# Patient Record
Sex: MALE | Race: WHITE
[De-identification: names, ages, dates, MRNs, and addresses within clinical notes are randomized per-mention and may not be internally consistent; named-entity substitution may affect disease eponyms.]

---

## 2018-05-28 ENCOUNTER — HOSPITAL ENCOUNTER (EMERGENCY)
Dept: HOSPITAL 92 - ERS | Age: 73
Discharge: HOME | End: 2018-05-28
Payer: COMMERCIAL

## 2018-05-28 DIAGNOSIS — Z79.899: ICD-10-CM

## 2018-05-28 DIAGNOSIS — K08.89: Primary | ICD-10-CM

## 2018-05-28 DIAGNOSIS — Z79.82: ICD-10-CM

## 2018-05-28 DIAGNOSIS — E11.9: ICD-10-CM

## 2018-05-28 DIAGNOSIS — Z87.891: ICD-10-CM

## 2018-05-28 DIAGNOSIS — I10: ICD-10-CM

## 2018-05-28 LAB
ALBUMIN SERPL BCG-MCNC: 3.6 G/DL (ref 3.4–4.8)
ALP SERPL-CCNC: 111 U/L (ref 40–150)
ALT SERPL W P-5'-P-CCNC: 15 U/L (ref 8–55)
ANION GAP SERPL CALC-SCNC: 9 MMOL/L (ref 10–20)
AST SERPL-CCNC: 14 U/L (ref 5–34)
BASOPHILS # BLD AUTO: 0 THOU/UL (ref 0–0.2)
BASOPHILS NFR BLD AUTO: 1.1 % (ref 0–1)
BILIRUB SERPL-MCNC: 0.5 MG/DL (ref 0.2–1.2)
BUN SERPL-MCNC: 18 MG/DL (ref 8.4–25.7)
CALCIUM SERPL-MCNC: 8.8 MG/DL (ref 7.8–10.44)
CHLORIDE SERPL-SCNC: 106 MMOL/L (ref 98–107)
CO2 SERPL-SCNC: 26 MMOL/L (ref 23–31)
CREAT CL PREDICTED SERPL C-G-VRATE: 0 ML/MIN (ref 70–130)
EOSINOPHIL # BLD AUTO: 0.2 THOU/UL (ref 0–0.7)
EOSINOPHIL NFR BLD AUTO: 3.7 % (ref 0–10)
GLOBULIN SER CALC-MCNC: 2.8 G/DL (ref 2.4–3.5)
GLUCOSE SERPL-MCNC: 128 MG/DL (ref 83–110)
HGB BLD-MCNC: 12.1 G/DL (ref 14–18)
LYMPHOCYTES # BLD: 1 THOU/UL (ref 1.2–3.4)
LYMPHOCYTES NFR BLD AUTO: 22.1 % (ref 21–51)
MCH RBC QN AUTO: 30.1 PG (ref 27–31)
MCV RBC AUTO: 87.9 FL (ref 80–94)
MONOCYTES # BLD AUTO: 0.4 THOU/UL (ref 0.11–0.59)
MONOCYTES NFR BLD AUTO: 8.2 % (ref 0–10)
NEUTROPHILS # BLD AUTO: 2.9 THOU/UL (ref 1.4–6.5)
NEUTROPHILS NFR BLD AUTO: 65 % (ref 42–75)
PLATELET # BLD AUTO: 158 THOU/UL (ref 130–400)
POTASSIUM SERPL-SCNC: 4.4 MMOL/L (ref 3.5–5.1)
RBC # BLD AUTO: 4 MILL/UL (ref 4.7–6.1)
SODIUM SERPL-SCNC: 137 MMOL/L (ref 136–145)
WBC # BLD AUTO: 4.4 THOU/UL (ref 4.8–10.8)

## 2018-05-28 PROCEDURE — 85025 COMPLETE CBC W/AUTO DIFF WBC: CPT

## 2018-05-28 PROCEDURE — 70487 CT MAXILLOFACIAL W/DYE: CPT

## 2018-05-28 PROCEDURE — 80053 COMPREHEN METABOLIC PANEL: CPT

## 2018-05-28 PROCEDURE — 36415 COLL VENOUS BLD VENIPUNCTURE: CPT

## 2018-05-28 NOTE — CT
POSTCONTRAST FACE CT:

 

HISTORY: 

Tooth removed on 5/22.  The patient reports pain and swelling.  Evaluate for possible abscess.

 

COMPARISON: 

None.

 

TECHNIQUE: 

Face CT is performed in the axial plane.  Reformatted images are submitted for interpretation.

 

FINDINGS: 

The visualized brain parenchyma is unremarkable.  No pathologic enhancement.  Bilateral globes are in
tact.  Retrobulbar fat is preserved.  Symmetric attenuation of the optic nerves and ocular rectus mus
cles.

 

There is extensive hypodensity involving the left and right ethmoid air cells and nasal cavity.  Abno
rmal hypodensity extends into both frontal sinuses.  The cribiform plate appears to have erosive felix
ges versus demineralization.  The aforementioned findings may be due to infectious, inflammatory proc
ess.  An underlying neoplastic process cannot be completely excluded.  Obvious intracranial extension
 is not appreciated.  Better interrogation with nonemergent brain MRI and ENT consultation is recomme
nded.  There is mucosal disease involving both maxillary sinuses.

 

Pterygopalatine fossas are patent and symmetric.  Visualized aerodigestive tract is patent.  No mucos
al abnormality.  Midline fatty raphe of the tongue is preserved.  There is no prevertebral soft tissu
e swelling.  Epiglottis has a normal caliber.  Preepiglottic fat is preserved.  Symmetric attenuation
 of the muscles of the mastication, submandibular glands, and parotid gland.

 

No evidence of a soft tissue abscess.  No significant induraiton or swelling of the facial soft tissu
es.

 

Mixed attenuation mass is noted posterior and lateral to the supraglottic larynx.  Evaluation is inco
mplete.  

 

There is evidence of absent multiple maxillary molar teeth.  There is a small focus of air a a left m
axillary molar tooth, like due to recent extraction.  The air is in the socket and is presumed to be 
post operative. The mandibular teeth are intact.

 

IMPRESSION: 

1.  No evidence of periapical abscess.  No evidence of periodontal disease/periodontal abscess.  No e
vidence of soft tissue abscess.  There is air in the left molar tooth socket and is presumed to be du
e to recent extraction. 

 

2.  Heterogeneous mass, incompletely evaluated posterior and lateral to the supraglottic larynx.  Non
emergent postcontrast soft tissue neck CT is recommended.

 

3.  Abnormal attenuation occupying the nasal cavity as above.  There is possible demineralization barbara
gaby erosion of the mid portion of the cribriform plates.  Nonemergent MRI is recommended.

 

Results of the study discussed with Lenora Orourke, 5/28/18 at 3:39 p.m.

 

CODE CR

 

POS: [facility]

## 2018-07-06 ENCOUNTER — HOSPITAL ENCOUNTER (OUTPATIENT)
Dept: HOSPITAL 92 - ULT | Age: 73
Discharge: HOME | End: 2018-07-06
Attending: OTOLARYNGOLOGY
Payer: COMMERCIAL

## 2018-07-06 DIAGNOSIS — E04.2: ICD-10-CM

## 2018-07-06 DIAGNOSIS — E04.9: Primary | ICD-10-CM

## 2018-07-06 PROCEDURE — 76536 US EXAM OF HEAD AND NECK: CPT

## 2018-07-06 NOTE — ULT
THYROID SONOGRAM:

 

HISTORY: 

Neck mass.  Thyromegaly.

 

FINDINGS: 

Each thyroid lobe is 5.9 cm.  The isthmus is 0.8 cm.  There is marked heterogeneity of the entirety o
f the thyroid gland.  Multiple lobular complex masses are present throughout each thyroid lobe.  On t
he left, the largest is at the inferior pole measuring up to 3.3 cm.  The largest on the right is at 
the superior pole measuring up to 1.8 cm.

 

IMPRESSION: 

Multinodular goiter.

 

POS: [facility]

## 2018-08-29 ENCOUNTER — HOSPITAL ENCOUNTER (OUTPATIENT)
Dept: HOSPITAL 92 - BICRAD | Age: 73
Discharge: HOME | End: 2018-08-29
Attending: REGISTERED NURSE
Payer: COMMERCIAL

## 2018-08-29 DIAGNOSIS — I27.20: Primary | ICD-10-CM

## 2018-08-29 PROCEDURE — 83520 IMMUNOASSAY QUANT NOS NONAB: CPT

## 2018-08-29 PROCEDURE — 36415 COLL VENOUS BLD VENIPUNCTURE: CPT

## 2018-08-29 PROCEDURE — 86225 DNA ANTIBODY NATIVE: CPT

## 2018-08-29 PROCEDURE — 86235 NUCLEAR ANTIGEN ANTIBODY: CPT

## 2018-08-29 PROCEDURE — 86038 ANTINUCLEAR ANTIBODIES: CPT

## 2018-08-29 PROCEDURE — 86256 FLUORESCENT ANTIBODY TITER: CPT

## 2018-08-29 PROCEDURE — 86200 CCP ANTIBODY: CPT

## 2018-08-29 PROCEDURE — 71046 X-RAY EXAM CHEST 2 VIEWS: CPT

## 2018-09-18 NOTE — ULT
BILATERAL LOWER EXTREMITY VENOUS ULTRASOUND WITH DOPPLER

9/18/18

 

HISTORY: 

Varicose veins. 

 

COMPARISON:  

None.

 

TECHNIQUE:  

Gray scale, color flow, doppler imaging with spectral waveform analysis performed in the left and rig
ht lower extremity venous system. 

 

FINDINGS:  

Bilaterally, there is compressibility, presence of flow and augmentation in the common femoral vein, 
femoral vein, and popliteal veins. There is flow in bilateral greater saphenous veins, profunda veins
, and posterior tibial veins. 

 

IMPRESSION:  

No evidence of thrombus in the left or right lower extremity deep venous system. 

 

POS: [facility]

## 2018-09-21 NOTE — HP
SHORT STAY HISTORY AND PHYSICAL

 

DATE OF ADMISSION:  09/21/2018

 

HISTORY OF PRESENT ILLNESS:  Mr. Darya Dunham is a 73-year-old male referred in 
for colonoscopy for colon cancer screening.  The patient has never had a 
colonoscopy.  The patient has no specific GI symptoms.  His bowel movement is 
regular.  There is no history of rectal bleeding or melena.

 

ALLERGIES:  None.

 

SOCIAL HISTORY:  The patient is a former smoker. .  Alcohol intake.

 

MEDICAL ILLNESSES:

1.  Bronchial asthma.

2.  Benign prostatic hypertrophy.

3.  Coronary artery disease, status post stent placement.

4.  Obesity.

5.  Hypertension.

6.  Hyperlipidemia.

7.  Diabetes.

8.  Glaucoma.

9.  Multinodular goiter.

 

PHYSICAL EXAMINATION:

GENERAL:  Appears comfortable.

VITAL SIGNS:  Pulse is 70, blood pressure 130/70.

HEENT:  Conjunctivae clear.

CARDIOVASCULAR SYSTEM:  First and second heart sounds normal.

LUNGS:  Clear to auscultation.

ABDOMEN:  Soft to palpate.  No organomegaly.  No tenderness.  No masses.

 

ADMITTING DIAGNOSIS:  A 73-year-old male with mild anemia.  He has no upper GI 
symptoms.  The patient comes for colonoscopy for colon cancer screening.

 

SKYE

## 2018-09-21 NOTE — ULT
BILATERAL LOWER EXTREMITY ARTERIAL ULTRASOUND:

 

Date:  09/18/18 

 

Bilateral lower extremity arterial ultrasound was performed. 

 

On the right, femoral waveform is triphasic with good peak area under the curve. Popliteal and poster
ior tibial artery waveforms are triphasic with slight diminution in their waveforms peaks. Dorsalis p
manjit waveform is back at a normal peaked waveform with triphasic wave. Ankle brachial index is 1.04. 


 

On the left, femoral, popliteal, dorsalis pedis, and posterior tibial artery waveforms are nicely pea
ked with good peak area. Ankle brachial index is 0.95.

 

ASSESSMENT:

Mild SFA/popliteal disease on the right with normal ankle brachial index. Normal study on the left.

## 2018-09-21 NOTE — OP
DATE OF PROCEDURE:  09/21/2018

 

SURGEON:  David Castle M.D.

 

OPERATIVE PROCEDURE:  

1.  Colonoscopy with polypectomy.  

2.  Colonoscopy with biopsy.

 

PREOPERATIVE DIAGNOSES:  A 73-year-old male with a mild anemia undergoing colonoscopy for colon cance
r screening.

 

POSTOPERATIVE DIAGNOSES:

1.  Sessile cecal polyp removed with biopsy forceps.

2.  A sessile distal transverse colon polyp close to the splenic flexure, status post snare cautery w
ith good hemostasis.

3.  Sessile sigmoid polyp, status post snare cautery with good hemostasis.

4.  Sessile sigmoid polyp at 25 cm, status post snare cautery with good hemostasis.

5.  Mild sigmoid diverticular disease.

6.  Hemorrhoids.

 

PROCEDURE IN DETAIL:  The patient was placed on his left lateral position and was given sedation by A
nesthesia Department.  A rectal exam was done before the scope was advanced into the rectum.  No othe
r lesion felt on rectal exam.  A Pentax video colonoscope was introduced into the rectum and advanced
 all the way into the cecum.  The prep was good.  The appendical orifice, ileocecal valve, no patholo
gy.  A 5 cm size sessile polyp cecum removed.  The ascending colon, hepatic flexure, proximal transve
rse colon, no pathology seen.  Over the distal transverse colon close to splenic flexure the patient 
found to have a sessile polyp.  This was removed with snare cautery with good hemostasis.  There were
 2 polyps in the sigmoid colon.  Both were sessile and both were removed with snare cautery with good
 hemostasis.  He also had mild sigmoid diverticular disease.  Retroflexion of the scope in the rectum
 showed hemorrhoids.

 

DISCHARGE PLANNING:  A 73-year-old male who came for a colonoscopy for colon cancer screening.  He un
derwent polypectomy.  There were 4 polyps removed.

 

DISCHARGE RECOMMENDATIONS:

1.  The patient was advised to call me if he develops abdominal pain, hematochezia or fever.

2.  To come back to clinic in 2 weeks.

## 2018-10-04 NOTE — OP
DATE OF PROCEDURE:  10/04/2018

 

PREOPERATIVE DIAGNOSES:  Nasal polyposis, allergic fungal sinusitis, deviated septum, hypertrophic in
ferior turbinates.

 

POSTOPERATIVE DIAGNOSES:  Nasal polyposis, allergic fungal sinusitis, deviated septum, hypertrophic i
nferior turbinates.

 

PROCEDURES PERFORMED:

1.  Bilateral nasal endoscopy with maxillary antrostomy with removal of tissue.

2.  Bilateral nasal endoscopy with total ethmoidectomy.

3.  Bilateral nasal endoscopy with frontal sinusotomy.

4.  Bilateral nasal endoscopy with sphenoidotomy with removal of tissue.

5.  Bilateral nasal endoscopy with nasal polypectomy.

6.  Septoplasty.

7.  Bilateral nasal endoscopy with submucosal resection of inferior turbinates.

 

PROCEDURE IN DETAIL:  After consent was obtained, the patient was identified, brought to the operatin
g room, and placed on the operating room table in the supine position.  Consent was obtained, notifyi
ng the patient of the possibility of additional infections, bleeding, brain injury, and eye/orbital i
njury.   The patient was placed on the operating room table, and general endotracheal anesthesia and 
intravenous access was obtained.  The patient was then positioned, prepped and draped for endoscopic 
sinus surgery.  Nasal preparation included trimming nasal vestibular hairs and spraying in topical Af
rin.  We then placed Afrin topical solution on nasal pledgets and strategically located them intranas
ally.  The perinasal mucosa was injected with 1% lidocaine with 1:100,000 epinephrine in the submucop
erichondrial plane of the septum, lateral nasal wall, and anterior to the uncinate.  The patient was 
then prepped and draped in a sterile fashion and positioned for endoscopic sinus surgery.

 

(Endoscopic Sinus Surgery)

With the 0-degree endoscope, the patient underwent systematic nasal endoscopy.  There were no suspici
ous internasal masses or lesions identified.  We then focused our attention to the osteomeatal comple
x region under the middle turbinate.

 

(Tammy Bullosa)

The tammy bullosa was identified and entered with a sickle blade.  The

lateral aspect of the tammy bullosa was meticulously resected while leaving the medial most aspect t
o form the new middle turbinate.  Attention was made not to violate the mucosa.  The straight biting 
punches and micro-debrider were used to remove shrouds of mucosa and bony debris.

 

(Maxillary Antrostomy)

The uncinate was then identified and the extent of the uncinate was

appreciated by out-fracturing the uncinate with the ball-tip probe.  We then used the sickle blade to
 disarticulate the uncinate from the lateral nasal wall.  This was then removed with straight biting 
and upbiting punches with the remaining shrouds of mucosa and bony septum removed with the micro-debr
ider.  The natural os of the maxillary sinus was then identified and enlarged with the maxillary punc
hes and back biting forceps.

 

(Total Ethmoidectomy)

The anterior face of the ethmoid bulla was entered and with the

micro-debrider, dissection continued posteriorly to the ground lamella.  The limits of dissection inc
luded the insertion of the middle turbinate, medial orbital wall, and base of skull.  We similarly id
entified the frontal recess and removed shrouds of bone and debris in that region to obtain patency i
nto the agger nasi region and frontal recess.  We then entered the ground lamella and its anteroinfer
ior aspect and proceeded posteriorly, opening the posterior ethmoid air-cell system.  Again, the limi
ts of dissection included the base of skull and medial orbital wall.

 

(Sphenoidotomy)

The anterior face of the sphenoid was identified and entered in its extreme

anteroinferior aspect.  A sphenoid punch was then used to enlarge the

sphenoidotomy and no injury to the optic nerve or internal carotid artery

occurred.

 

(Outfracture of the Inferior Turbinates)

The inferior turbinates were visualized under endoscopic visualization and

outfractured with the elevator.  The inferolateral edge of the inferior

turbinate was then cauterized along its length with the suction cautery

without difficulty.

 

(Outfracture & Cautery of the Inferior Turbinates)

The inferior turbinates were visualized with a 0-degree endoscope and

outfractured with a [place] elevator.  The inferior medial aspect was cauterized with the electrocauter
y.  Hemostasis was obtained .  After adequate airway was established, we turned our attention to the 
contralateral side and used a similar procedure.  Again, a Tucker elevator was used to outfracture inf
erior turbinates under endoscopic visualization.  With a suction cautery, the free inferior medial as
pect was cauterized under direct visualization along the length of the inferior turbinate.

 

(Septoplasty)

After local anesthesia was infiltrated into the submucoperichondrial plane, a standard [place] incisi
on was made with a #15 blade down to the level of the septal cartilage.  The caudal elevator was used
 to elevate the mucoperichondrium from the underlying cartilage.  We then proceeded beyond the bony c
artilaginous junction and elevated the bony periosteum as well.  Great attention was paid to the spur
 to prevent rent formation in the septal flap. A transcartilaginous incision was then made, while pre
serving an adequate dorsal and caudal cartilaginous strut for tip support.  The deformed cartilage wa
s removed and disarticulated from the bony cartilaginous junction and maxillary crest.  This was plac
ed in saline and would later be crushed and returned to the mucoperichondrial envelope.  We then elev
ated the contralateral periosteum from the bony cartilaginous region and removed the deformed portion
s of the bone and bony spurs.  The cartilage was then crushed and placed back into the mucoperichondr
ial envelope and the mucosa was re-approximated with a quilting stitch composed of rapidly absorbent 
gut suture.  The Kishore incision was also closed with interrupted gut suture.  At the completion of 
the case, Cheng splints were placed and suture secured to the caudal septum.

 

At this point, we then turned our attention to the contralateral side and

proceeded with endoscopic sinus surgery.

 

At the completion of the case, Rice keel splints were placed in the ethmoid

cavities after the ethmoidectomy.  There were no complications.  The patient tolerated the procedure 
well and was discharged to the recovery room in stable condition prior to return to the preoperative 
Day Stay with ultimate discharge home.  Prescriptions for pain medication and antibiotics were provid
ed.  The patient received intramuscular Depo-Medrol during the case.

## 2018-10-07 NOTE — EKG
Test Reason : PREOP

Blood Pressure : ***/*** mmHG

Vent. Rate : 079 BPM     Atrial Rate : 079 BPM

   P-R Int : 148 ms          QRS Dur : 096 ms

    QT Int : 366 ms       P-R-T Axes : 043 033 081 degrees

   QTc Int : 419 ms

 

Normal sinus rhythm

Normal ECG

No previous ECGs available

Confirmed by ELENA ROSAS ([id number]) on 10/7/2018 8:54:58 PM

 

Referred By:  NGHIA           Confirmed By:ELENA ROSAS

## 2019-07-18 ENCOUNTER — APPOINTMENT (RX ONLY)
Dept: URBAN - METROPOLITAN AREA CLINIC 91 | Facility: CLINIC | Age: 74
Setting detail: DERMATOLOGY
End: 2019-07-18

## 2019-07-18 DIAGNOSIS — R21 RASH AND OTHER NONSPECIFIC SKIN ERUPTION: ICD-10-CM

## 2019-07-18 PROBLEM — J45.909 UNSPECIFIED ASTHMA, UNCOMPLICATED: Status: ACTIVE | Noted: 2019-07-18

## 2019-07-18 PROBLEM — J44.9 CHRONIC OBSTRUCTIVE PULMONARY DISEASE, UNSPECIFIED: Status: ACTIVE | Noted: 2019-07-18

## 2019-07-18 PROBLEM — N40.0 BENIGN PROSTATIC HYPERPLASIA WITHOUT LOWER URINARY TRACT SYMPTOMS: Status: ACTIVE | Noted: 2019-07-18

## 2019-07-18 PROBLEM — E13.9 OTHER SPECIFIED DIABETES MELLITUS WITHOUT COMPLICATIONS: Status: ACTIVE | Noted: 2019-07-18

## 2019-07-18 PROBLEM — M12.9 ARTHROPATHY, UNSPECIFIED: Status: ACTIVE | Noted: 2019-07-18

## 2019-07-18 PROBLEM — I10 ESSENTIAL (PRIMARY) HYPERTENSION: Status: ACTIVE | Noted: 2019-07-18

## 2019-07-18 PROBLEM — I25.10 ATHEROSCLEROTIC HEART DISEASE OF NATIVE CORONARY ARTERY WITHOUT ANGINA PECTORIS: Status: ACTIVE | Noted: 2019-07-18

## 2019-07-18 PROCEDURE — 11104 PUNCH BX SKIN SINGLE LESION: CPT

## 2019-07-18 PROCEDURE — ? BIOPSY BY PUNCH METHOD

## 2019-07-18 PROCEDURE — ? COUNSELING

## 2019-07-18 PROCEDURE — ? PRESCRIPTION

## 2019-07-18 RX ORDER — CLOBETASOL PROPIONATE 0.5 MG/G
OINTMENT TOPICAL
Qty: 1 | Refills: 2 | Status: ERX | COMMUNITY
Start: 2019-07-18

## 2019-07-18 RX ADMIN — CLOBETASOL PROPIONATE: 0.5 OINTMENT TOPICAL at 00:00

## 2019-07-18 ASSESSMENT — LOCATION DETAILED DESCRIPTION DERM
LOCATION DETAILED: RIGHT ANTERIOR PROXIMAL THIGH
LOCATION DETAILED: LEFT PROXIMAL PRETIBIAL REGION
LOCATION DETAILED: PERIUMBILICAL SKIN
LOCATION DETAILED: LEFT LATERAL INFERIOR CHEST
LOCATION DETAILED: LEFT LATERAL SUPERIOR CHEST
LOCATION DETAILED: LEFT ANTERIOR DISTAL THIGH
LOCATION DETAILED: RIGHT ANTERIOR PROXIMAL UPPER ARM
LOCATION DETAILED: LEFT ANTERIOR PROXIMAL UPPER ARM
LOCATION DETAILED: RIGHT PROXIMAL PRETIBIAL REGION

## 2019-07-18 ASSESSMENT — LOCATION SIMPLE DESCRIPTION DERM
LOCATION SIMPLE: ABDOMEN
LOCATION SIMPLE: LEFT THIGH
LOCATION SIMPLE: RIGHT PRETIBIAL REGION
LOCATION SIMPLE: RIGHT THIGH
LOCATION SIMPLE: CHEST
LOCATION SIMPLE: LEFT UPPER ARM
LOCATION SIMPLE: RIGHT UPPER ARM
LOCATION SIMPLE: LEFT PRETIBIAL REGION

## 2019-07-18 ASSESSMENT — LOCATION ZONE DERM
LOCATION ZONE: ARM
LOCATION ZONE: LEG
LOCATION ZONE: TRUNK

## 2019-07-18 NOTE — PROCEDURE: BIOPSY BY PUNCH METHOD
Biopsy Type: H and E
Hemostasis: None
Dressing: bandage
Post-Care Instructions: I reviewed with the patient in detail post-care instructions. Patient is to keep the biopsy site dry overnight. The patient can remove the bandage, clean the site with soap and water and then apply vaseline twice daily until sutures are removed. Patient may apply hydrogen peroxide soaks to remove any crusting.
Patient Will Remove Sutures At Home?: No
Lab: [id number]
Was A Bandage Applied: Yes
Anesthesia Type: 1% lidocaine without epinephrine
Size Of Lesion In Cm (Optional): 0
Home Suture Removal Text: Patient was provided a home suture removal kit and will remove their sutures at home.  If they have any questions or difficulties they will call the office.
Lab Facility: [id number]
Detail Level: Detailed
Notification Instructions: Patient will be notified of biopsy results. However, patient instructed to call the office if not contacted within 2 weeks.
Punch Size In Mm: 3.5
Consent: Verbal consent was obtained and risks were reviewed including but not limited to scarring, infection, bleeding, scabbing, incomplete removal, nerve damage and allergy to anesthesia.
Billing Type: Third-Party Bill
Epidermal Sutures: 3-0 Chromic Gut
Anesthesia Volume In Cc (Will Not Render If 0): 0.5
Wound Care: Vaseline

## 2019-07-30 ENCOUNTER — RX ONLY (OUTPATIENT)
Age: 74
Setting detail: RX ONLY
End: 2019-07-30

## 2019-07-30 RX ORDER — CLOBETASOL PROPIONATE 0.5 MG/G
OINTMENT TOPICAL
Qty: 1 | Refills: 1 | Status: ERX

## 2019-08-12 ENCOUNTER — APPOINTMENT (RX ONLY)
Dept: URBAN - METROPOLITAN AREA CLINIC 91 | Facility: CLINIC | Age: 74
Setting detail: DERMATOLOGY
End: 2019-08-12

## 2019-08-12 DIAGNOSIS — Z48.02 ENCOUNTER FOR REMOVAL OF SUTURES: ICD-10-CM

## 2019-08-12 PROCEDURE — ? SUTURE REMOVAL (NO GLOBAL PERIOD)

## 2019-08-12 ASSESSMENT — LOCATION SIMPLE DESCRIPTION DERM: LOCATION SIMPLE: CHEST

## 2019-08-12 ASSESSMENT — LOCATION DETAILED DESCRIPTION DERM: LOCATION DETAILED: LEFT LATERAL SUPERIOR CHEST

## 2019-08-12 ASSESSMENT — LOCATION ZONE DERM: LOCATION ZONE: TRUNK

## 2020-03-09 NOTE — ULT
Please see the separately dictated, concurrently performed renal ultrasound for details concerning th
e ureteral duplex evaluation.



Reported By: Colin De 

Electronically Signed:  3/9/2020 2:22 PM

## 2020-03-09 NOTE — ULT
RENAL ULTRASOUND WITH DUPLEX EVALUATION



HISTORY: Chronic kidney disease stage III



COMPARISON: None



FINDINGS: Grayscale, color Doppler spectral Doppler images were obtained.



Right Kidney:

Size:  10.2 x 5.8 x 6.3. Right renal cortical thickness was 1.6 cm.

Abnormality: There are multiple right renal cysts. When the largest cyst is seen measuring 5.5 cm toshia
ng its lateral margin. The right renal artery to aortic ratio is 2.5. The peak systolic velocity

within the sampled right renal arteries 155.4 cm/s. Resistive index within the right arcuate artery w
as 0.63.



Left Kidney:

Size:  12.0 x 5.5 x 4.1 cm and left renal cortical thickness was 1.8 cm.

Abnormality: There is a 3 cm peripelvic cyst involving the superior pole of the left kidney. The left
 renal artery to aortic ratio is 2.2. Peak systolic velocity within the left main renal artery was

135.4 cm/s. Sample resistive index within the left renal arcuate artery 0.61.



Urinary bladder: Not imaged



Aorta: The peak systolic velocity within the abdominal aorta was 62 cm/s





IMPRESSION: 

1. Bilateral renal cysts. No hydronephrosis.

2. No sonographic evidence to suggest presence of renal artery stenosis.



Reported By: Colin De 

Electronically Signed:  3/9/2020 2:12 PM

## 2020-04-16 ENCOUNTER — APPOINTMENT (RX ONLY)
Dept: URBAN - METROPOLITAN AREA CLINIC 91 | Facility: CLINIC | Age: 75
Setting detail: DERMATOLOGY
End: 2020-04-16

## 2020-04-16 DIAGNOSIS — L29.89 OTHER PRURITUS: ICD-10-CM

## 2020-04-16 DIAGNOSIS — L29.8 OTHER PRURITUS: ICD-10-CM

## 2020-04-16 PROCEDURE — 99213 OFFICE O/P EST LOW 20 MIN: CPT

## 2020-04-16 PROCEDURE — ? TREATMENT REGIMEN

## 2020-04-16 PROCEDURE — ? COUNSELING

## 2020-04-16 PROCEDURE — ? PRESCRIPTION

## 2020-04-16 RX ORDER — HALOBETASOL PROPIONATE 0.5 MG/G
CREAM TOPICAL
Qty: 1 | Refills: 2 | Status: ERX | COMMUNITY
Start: 2020-04-16

## 2020-04-16 RX ADMIN — HALOBETASOL PROPIONATE: 0.5 CREAM TOPICAL at 00:00

## 2020-04-16 ASSESSMENT — LOCATION ZONE DERM: LOCATION ZONE: TRUNK

## 2020-04-16 ASSESSMENT — LOCATION DETAILED DESCRIPTION DERM: LOCATION DETAILED: RIGHT SUPERIOR MEDIAL UPPER BACK

## 2020-04-16 ASSESSMENT — LOCATION SIMPLE DESCRIPTION DERM: LOCATION SIMPLE: RIGHT UPPER BACK

## 2020-04-16 NOTE — HPI: RASH
How Severe Is Your Rash?: moderate
Is This A New Presentation, Or A Follow-Up?: Rash
Additional History: Not helping. Patient has tried triamcinolone no help. Has treated for scabies no help. Clobetasol helped. Patient has had DX for neurotic dermatitis.

## 2020-04-16 NOTE — PROCEDURE: TREATMENT REGIMEN
Continue Regimen: Otc antihistamines.
Detail Level: Zone
Plan: Use dove sensitive bar and Cerave or Cetaphil moisturizer. Avoid hot water. Switch to All free and clear detergent. Apply cold pack for intense itch. Discussed possibly due to medications advised patient to see PCP. No signs of neurotic dermatitis today.

## 2020-04-23 ENCOUNTER — RX ONLY (OUTPATIENT)
Age: 75
Setting detail: RX ONLY
End: 2020-04-23

## 2020-04-23 RX ORDER — HALOBETASOL PROPIONATE 0.5 MG/G
OINTMENT TOPICAL
Qty: 1 | Refills: 1 | Status: ERX | COMMUNITY
Start: 2020-04-23

## 2021-11-10 ENCOUNTER — APPOINTMENT (RX ONLY)
Dept: URBAN - METROPOLITAN AREA CLINIC 99 | Facility: CLINIC | Age: 76
Setting detail: DERMATOLOGY
End: 2021-11-10

## 2021-11-10 DIAGNOSIS — Z71.89 OTHER SPECIFIED COUNSELING: ICD-10-CM

## 2021-11-10 DIAGNOSIS — L29.8 OTHER PRURITUS: ICD-10-CM | Status: INADEQUATELY CONTROLLED

## 2021-11-10 DIAGNOSIS — L29.89 OTHER PRURITUS: ICD-10-CM | Status: INADEQUATELY CONTROLLED

## 2021-11-10 DIAGNOSIS — F42.4 EXCORIATION (SKIN-PICKING) DISORDER: ICD-10-CM

## 2021-11-10 PROBLEM — S40.911A UNSPECIFIED SUPERFICIAL INJURY OF RIGHT SHOULDER, INITIAL ENCOUNTER: Status: ACTIVE | Noted: 2021-11-10

## 2021-11-10 PROBLEM — S50.912A UNSPECIFIED SUPERFICIAL INJURY OF LEFT FOREARM, INITIAL ENCOUNTER: Status: ACTIVE | Noted: 2021-11-10

## 2021-11-10 PROCEDURE — ? TREATMENT REGIMEN

## 2021-11-10 PROCEDURE — ? SUNSCREEN RECOMMENDATIONS

## 2021-11-10 PROCEDURE — 99214 OFFICE O/P EST MOD 30 MIN: CPT

## 2021-11-10 PROCEDURE — ? PRESCRIPTION MEDICATION MANAGEMENT

## 2021-11-10 PROCEDURE — ? COUNSELING

## 2021-11-10 PROCEDURE — ? ORDER TESTS

## 2021-11-10 ASSESSMENT — LOCATION ZONE DERM
LOCATION ZONE: ARM
LOCATION ZONE: ARM

## 2021-11-10 ASSESSMENT — LOCATION DETAILED DESCRIPTION DERM
LOCATION DETAILED: LEFT DISTAL DORSAL FOREARM
LOCATION DETAILED: LEFT DISTAL DORSAL FOREARM
LOCATION DETAILED: RIGHT DISTAL POSTERIOR UPPER ARM
LOCATION DETAILED: RIGHT DISTAL POSTERIOR UPPER ARM

## 2021-11-10 ASSESSMENT — LOCATION SIMPLE DESCRIPTION DERM
LOCATION SIMPLE: LEFT FOREARM
LOCATION SIMPLE: RIGHT UPPER ARM
LOCATION SIMPLE: RIGHT UPPER ARM
LOCATION SIMPLE: LEFT FOREARM

## 2021-11-10 NOTE — HPI: RASH
What Type Of Note Output Would You Prefer (Optional)?: Standard Output
How Severe Is Your Rash?: moderate
Is This A New Presentation, Or A Follow-Up?: Rash
Additional History: Pt has prev hx of itch. Pt states Dr in [place] dx neurotic dermatitis.

## 2021-11-10 NOTE — PROCEDURE: ORDER TESTS
Bill For Surgical Tray: no
Performing Laboratory: [id number]
Expected Date Of Service: 11/10/2021
Billing Type: Third-Party Bill

## 2021-11-10 NOTE — PROCEDURE: TREATMENT REGIMEN
Otc Regimen: 24hr Allegra AM, Zyrtec PM.
Detail Level: Zone
Initiate Treatment: Cooler water with dove sensitive bar. Cerave moisturizer. All free and clear detergent.

## 2021-11-10 NOTE — PROCEDURE: PRESCRIPTION MEDICATION MANAGEMENT
Render In Strict Bullet Format?: No
Initiate Treatment: Gabapentin (pt already has).
Detail Level: Zone
